# Patient Record
Sex: MALE
[De-identification: names, ages, dates, MRNs, and addresses within clinical notes are randomized per-mention and may not be internally consistent; named-entity substitution may affect disease eponyms.]

---

## 2017-05-01 ENCOUNTER — HOSPITAL ENCOUNTER (EMERGENCY)
Dept: HOSPITAL 14 - H.ER | Age: 32
Discharge: HOME | End: 2017-05-01
Payer: COMMERCIAL

## 2017-05-01 VITALS
DIASTOLIC BLOOD PRESSURE: 67 MMHG | HEART RATE: 87 BPM | OXYGEN SATURATION: 97 % | SYSTOLIC BLOOD PRESSURE: 119 MMHG | TEMPERATURE: 99 F

## 2017-05-01 VITALS — BODY MASS INDEX: 28.1 KG/M2

## 2017-05-01 DIAGNOSIS — I45.6: ICD-10-CM

## 2017-05-01 DIAGNOSIS — K52.9: Primary | ICD-10-CM

## 2017-05-01 DIAGNOSIS — R19.7: ICD-10-CM

## 2017-05-01 DIAGNOSIS — R11.2: ICD-10-CM

## 2017-05-01 LAB
ALBUMIN/GLOB SERPL: 1.2 {RATIO} (ref 1–2.1)
ALP SERPL-CCNC: 61 U/L (ref 38–126)
ALT SERPL-CCNC: 40 U/L (ref 21–72)
AST SERPL-CCNC: 36 U/L (ref 17–59)
BASOPHILS # BLD AUTO: 0 K/UL (ref 0–0.2)
BASOPHILS NFR BLD: 0.1 % (ref 0–2)
BILIRUB SERPL-MCNC: 0.9 MG/DL (ref 0.2–1.3)
BUN SERPL-MCNC: 19 MG/DL (ref 9–20)
CALCIUM SERPL-MCNC: 9.4 MG/DL (ref 8.4–10.2)
CHLORIDE SERPL-SCNC: 99 MMOL/L (ref 98–107)
CO2 SERPL-SCNC: 28 MMOL/L (ref 22–30)
EOSINOPHIL # BLD AUTO: 0 K/UL (ref 0–0.7)
EOSINOPHIL NFR BLD: 0.3 % (ref 0–4)
ERYTHROCYTE [DISTWIDTH] IN BLOOD BY AUTOMATED COUNT: 12.9 % (ref 11.5–14.5)
GLOBULIN SER-MCNC: 3.3 GM/DL (ref 2.2–3.9)
GLUCOSE SERPL-MCNC: 131 MG/DL (ref 75–110)
HCT VFR BLD CALC: 44.6 % (ref 35–51)
LYMPHOCYTES # BLD AUTO: 0.3 K/UL (ref 1–4.3)
LYMPHOCYTES NFR BLD AUTO: 3.2 % (ref 20–40)
MCH RBC QN AUTO: 30.8 PG (ref 27–31)
MCHC RBC AUTO-ENTMCNC: 35.2 G/DL (ref 33–37)
MCV RBC AUTO: 87.4 FL (ref 80–94)
MONOCYTES # BLD: 0.5 K/UL (ref 0–0.8)
MONOCYTES NFR BLD: 6.2 % (ref 0–10)
NEUTROPHILS # BLD: 7.6 K/UL (ref 1.8–7)
NEUTROPHILS NFR BLD AUTO: 86 % (ref 42–75)
NEUTROPHILS NFR BLD AUTO: 90.2 % (ref 50–75)
NRBC BLD AUTO-RTO: 0 % (ref 0–0)
PLATELET # BLD: 158 K/UL (ref 130–400)
PMV BLD AUTO: 8.8 FL (ref 7.2–11.7)
POTASSIUM SERPL-SCNC: 3.8 MMOL/L (ref 3.6–5)
PROT SERPL-MCNC: 7.5 G/DL (ref 6.3–8.2)
SODIUM SERPL-SCNC: 135 MMOL/L (ref 132–148)
TOTAL CELLS COUNTED BLD: 100
VARIANT LYMPHS NFR BLD MANUAL: 2 % (ref 0–0)
WBC # BLD AUTO: 8.4 K/UL (ref 4.8–10.8)

## 2017-05-01 NOTE — ED PDOC
HPI: General Adult


Time Seen by Provider: 05/01/17 08:22


Chief Complaint (Nursing): GI Problem


Chief Complaint (Provider): GI Problem


History Per: Patient


History/Exam Limitations: no limitations


Onset/Duration Of Symptoms: Days (x1 day)


Current Symptoms Are (Timing): Still Present


Additional Complaint(s): 





31 y/o male presents to the emergency department with a complaint of nausea, 

vomiting, and diarrhea since yesterday, 04/30/2017. Associated with body aches 

and chills. denies abdominal pain, bloody stool, dysuria, or frequency of 

urination.








PMD: Dr. Ernesto Corbin MD 





Past Medical History


Reviewed: Historical Data, Nursing Documentation, Vital Signs


Vital Signs: 


 Last Vital Signs











Temp  99 F   05/01/17 08:15


 


Pulse  87   05/01/17 08:15


 


Resp      


 


BP  119/67   05/01/17 08:15


 


Pulse Ox  97   05/01/17 08:38














- Medical History


PMH: Cardia Arrhythmia (WPW syndrome w ablation)





- Surgical History


Surgical History: No Surg Hx





- Family History


Family History: States: Unknown Family Hx





- Social History


Current smoker - smoking cessation education provided: No


Alcohol: Social


Drugs: Denies





- Home Medications


Home Medications: 


 Ambulatory Orders











 Medication  Instructions  Recorded


 


Ondansetron [Zofran] 4 mg PO Q8H #10 tab 05/01/17














- Allergies


Allergies/Adverse Reactions: 


 Allergies











Allergy/AdvReac Type Severity Reaction Status Date / Time


 


No Known Allergies Allergy   Verified 05/01/17 08:23














Review of Systems


ROS Statement: Except As Marked, All Systems Reviewed And Found Negative


Constitutional: Positive for: Chills, Other (body aches)


Gastrointestinal: Positive for: Nausea, Vomiting, Diarrhea.  Negative for: 

Abdominal Pain, Hematochezia


Genitourinary Male: Negative for: Dysuria, Frequency





Physical Exam





- Reviewed


Nursing Documentation Reviewed: Yes


Vital Signs Reviewed: Yes





- Physical Exam


Appears: Positive for: Non-toxic, No Acute Distress


Head Exam: Positive for: ATRAUMATIC, NORMOCEPHALIC


Skin: Positive for: Normal Color, Warm, Dry


ENT: Positive for: Normal ENT Inspection, Other (Dry mucous membranes).  

Negative for: Pharyngeal Erythema


Neck: Positive for: Normal, Supple


Cardiovascular/Chest: Positive for: Regular Rate, Rhythm.  Negative for: Murmur


Respiratory: Positive for: Normal Breath Sounds.  Negative for: Accessory 

Muscle Use, Respiratory Distress


Gastrointestinal/Abdominal: Positive for: Normal Exam, Soft.  Negative for: 

Tenderness


Back: Positive for: Normal Inspection.  Negative for: L CVA Tenderness, R CVA 

Tenderness


Neurologic/Psych: Positive for: Alert, Oriented





- Laboratory Results


Result Diagrams: 


 05/01/17 08:51





 05/01/17 08:51





- ECG


O2 Sat by Pulse Oximetry: 97 (RA)


Pulse Ox Interpretation: Normal





Medical Decision Making


Medical Decision Making: 





Time: 8:22


Initial plan:


--COMP Metabolic Panel


--ED Urine Dipstick (POC) Stat


--CBC w/ differential


--Pepcid 20 mg IVP


--Sodium Chloride 1,000 ml  mls/hr


--Zofran INJ 4 mg IVP


--Revaluation











Scribe Attestation:


Documented by Lizett Salguero, acting as a scribe for Timur Singh MD.





Provider Scribe Attestation:


All medical record entries made by the Scribe were at my direction and 

personally dictated by me. I have reviewed the chart and agree that the record 

accurately reflects my personal performance of the history, physical exam, 

medical decision making, and the department course for this patient. I have 

also personally directed, reviewed, and agree with the discharge instructions 

and disposition.





Disposition





- Clinical Impression


Clinical Impression: 


 Gastroenteritis








- Patient ED Disposition


Is Patient to be Admitted: No


Counseled Patient/Family Regarding: Studies Performed, Diagnosis, Need For 

Followup, Rx Given





- Disposition


Referrals: 


[facility] [Outside]


Disposition: Routine/Home


Disposition Time: 12:28


Condition: FAIR


Prescriptions: 


Ondansetron [Zofran] 4 mg PO Q8H #10 tab


Instructions:  Gastroenteritis (ED)

## 2018-06-04 ENCOUNTER — HOSPITAL ENCOUNTER (EMERGENCY)
Dept: HOSPITAL 14 - H.ER | Age: 33
LOS: 1 days | Discharge: HOME | End: 2018-06-05
Payer: COMMERCIAL

## 2018-06-04 VITALS
HEART RATE: 95 BPM | DIASTOLIC BLOOD PRESSURE: 92 MMHG | RESPIRATION RATE: 16 BRPM | SYSTOLIC BLOOD PRESSURE: 141 MMHG | OXYGEN SATURATION: 98 % | TEMPERATURE: 98.3 F

## 2018-06-04 VITALS — BODY MASS INDEX: 28.1 KG/M2

## 2018-06-04 DIAGNOSIS — M79.671: Primary | ICD-10-CM

## 2018-06-04 NOTE — ED PDOC
Lower Extremity Pain/Injury


Time Seen by Provider: 06/04/18 22:54


Chief Complaint (Nursing): Lower Extremity Problem/Injury


History Per: Patient


Additional Complaint(s): 





Pt. states earlier today at work after running he developed pain in his R foot 

area. Denies numbness, tingling, twisting injury, ankle pain. 





Past Medical History


Reviewed: Historical Data, Nursing Documentation, Vital Signs


Vital Signs: 





 Last Vital Signs











Temp  98.3 F   06/04/18 22:52


 


Pulse  95 H  06/04/18 22:52


 


Resp  16   06/04/18 22:52


 


BP  141/92 H  06/04/18 22:52


 


Pulse Ox  98   06/04/18 22:52














- Medical History


PMH: Cardia Arrhythmia (WPW syndrome w ablation)





- Family History


Family History: States: No Known Family Hx





- Home Medications


Home Medications: 


 Ambulatory Orders











 Medication  Instructions  Recorded


 


Ondansetron [Zofran] 4 mg PO Q8H #10 tab 05/01/17














- Allergies


Allergies/Adverse Reactions: 


 Allergies











Allergy/AdvReac Type Severity Reaction Status Date / Time


 


No Known Allergies Allergy   Verified 06/04/18 22:50














Review of Systems


ROS Statement: Except As Marked, All Systems Reviewed And Found Negative


Musculoskeletal: Positive for: Foot Pain





Physical Exam





- Physical Exam


Appears: Positive for: Well, Non-toxic, No Acute Distress


Skin: Positive for: Normal Color, Warm.  Negative for: Rash


Eye Exam: Positive for: Normal appearance


Pulses-Dorsalis Pedis (L): 2+


Pulses-Dorsalis Pedis (R): 2+


Extremity: Positive for: Capillary Refill (< 2 seconds of R foot), Other (RIGHT 

FOOT: no tenderness, swelling, deformity, or break in skin integrity)





- ECG


O2 Sat by Pulse Oximetry: 98





- Radiology


X-Ray: Interpreted by Me (R foot x-ray)


X-Ray Interpretation: No Acute Disease





Disposition





- Clinical Impression


Clinical Impression: 


 Foot pain








- Patient ED Disposition


Is Patient to be Admitted: No





- Disposition


Referrals: 


[facility] [Outside]


Ernesto Corbin MD [Primary Care Provider] - 


Disposition: Routine/Home


Disposition Time: 00:20


Condition: STABLE


Instructions:  Metatarsalgia


Forms:  [employer] (English), [facility] ED School/Work Excuse


Print Language: ENGLISH

## 2018-06-05 NOTE — RAD
PROCEDURE:  Right Foot Radiographs.



HISTORY:

pain  



COMPARISON:

Right foot radiographs dated 11/09/2011.



FINDINGS:



BONES:

No acute fracture.  Sequelae of prior trauma in the proximal 5th 

metatarsal. 



JOINTS:

Unremarkable. 



SOFT TISSUES:

Normal. 



OTHER FINDINGS:

Inferior plantar calcaneal spur.



IMPRESSION:

No demonstrated acute fracture or dislocation.